# Patient Record
Sex: MALE | Race: OTHER | Employment: FULL TIME | URBAN - METROPOLITAN AREA
[De-identification: names, ages, dates, MRNs, and addresses within clinical notes are randomized per-mention and may not be internally consistent; named-entity substitution may affect disease eponyms.]

---

## 2019-08-16 PROCEDURE — 99283 EMERGENCY DEPT VISIT LOW MDM: CPT | Performed by: EMERGENCY MEDICINE

## 2019-08-17 ENCOUNTER — HOSPITAL ENCOUNTER (EMERGENCY)
Age: 29
Discharge: HOME | End: 2019-08-17
Attending: EMERGENCY MEDICINE
Payer: SELF-PAY

## 2019-08-17 VITALS
HEART RATE: 84 BPM | HEIGHT: 69 IN | OXYGEN SATURATION: 100 % | RESPIRATION RATE: 18 BRPM | WEIGHT: 120 LBS | DIASTOLIC BLOOD PRESSURE: 89 MMHG | TEMPERATURE: 97.7 F | SYSTOLIC BLOOD PRESSURE: 144 MMHG | BODY MASS INDEX: 17.77 KG/M2

## 2019-08-17 DIAGNOSIS — L70.0 CYSTIC ACNE VULGARIS: Primary | ICD-10-CM

## 2019-08-17 RX ORDER — DOXYCYCLINE HYCLATE 100 MG
100 TABLET ORAL 2 TIMES DAILY
Qty: 60 TAB | Refills: 0 | Status: SHIPPED | OUTPATIENT
Start: 2019-08-17 | End: 2019-09-16

## 2019-08-17 NOTE — ED NOTES
I have reviewed discharge instructions with the patient. The patient verbalized understanding. Patient left ED via Discharge Method: ambulatory to Home with self with friend. Opportunity for questions and clarification provided. Patient given 1 scripts. To continue your aftercare when you leave the hospital, you may receive an automated call from our care team to check in on how you are doing. This is a free service and part of our promise to provide the best care and service to meet your aftercare needs.  If you have questions, or wish to unsubscribe from this service please call [phone]. Thank you for Choosing our [facility] Emergency Department.

## 2019-08-17 NOTE — DISCHARGE INSTRUCTIONS
Take Antibiotic as prescribed. Schedule follow-up w/ Dermatology + PCP. Return if symptoms worsen or progress in any way. Acne: Care Instructions  Your Care Instructions  Acne is a skin problem that shows up as blackheads, whiteheads, and pimples. It most often affects the face, neck, and upper body. Acne occurs when oil and dead skin cells clog the skin's pores. Acne usually starts during the teen years and often lasts into adulthood. Gentle cleansing every day controls most mild acne. If home treatment does not work, your doctor may prescribe creams, antibiotics, or a stronger medicine called isotretinoin. Sometimes birth control pills help women who have monthly acne flare-ups. Follow-up care is a key part of your treatment and safety. Be sure to make and go to all appointments, and call your doctor if you are having problems. It's also a good idea to know your test results and keep a list of the medicines you take. How can you care for yourself at home? · Gently wash your face 1 or 2 times a day with warm (not hot) water and a mild soap or cleanser. Always rinse well. · Use an over-the-counter lotion or gel that contains benzoyl peroxide. Start with a small amount of 2.5% benzoyl peroxide and increase the strength as needed. Benzoyl peroxide works well for acne, but you may need to use it for up to 2 months before your acne starts to improve. · Apply acne cream, lotion, or gel to all the places you get pimples, blackheads, or whiteheads, not just where you have them now. Follow the instructions carefully. If your skin gets too dry and scaly or red and sore, reduce the amount. For the best results, apply medicines as directed. Try not to miss doses. · Do not squeeze or pick pimples and blackheads. This can cause infection and scarring. · Use only oil-free makeup, sunscreen, and other skin care products that will not clog your pores.   · Wash your hair every day, and try to keep it off your face and shoulders. Consider pinning it back or cutting it short. When should you call for help? Watch closely for changes in your health, and be sure to contact your doctor if:    · You have tried home treatment for 6 to 8 weeks and your acne is not better or gets worse. Your doctor may need to add to or change your treatment.     · Your pimples become large and hard or filled with fluid.     · Scars form after pimples heal.     · You feel sad or hopeless, lack energy, or have other signs of depression while you are taking the prescription medicine isotretinoin.     · You start to have other symptoms, such as facial hair growth in women or bone and muscle pain. Where can you learn more? Go to http://dede-alberto.info/. Enter V108 in the search box to learn more about \"Acne: Care Instructions. \"  Current as of: April 1, 2019  Content Version: 12.1  © 6109-1309 [employer]. Care instructions adapted under license by [employer] (which disclaims liability or warranty for this information). If you have questions about a medical condition or this instruction, always ask your healthcare professional. [place] [address] any warranty or liability for your use of this information. Patient Education        Acne Medicine: Care Instructions  Your Care Instructions    Medicines can help acne. They can clean skin pores, kill germs, and reduce skin oil. And they can reduce the effects of hormones. The type of medicine you take depends on the type of acne you have. The best treatment often is a mix of medicines. For example, you might take pills and put medicine on your skin. Common acne medicines include:  · Benzoyl peroxide. This includes Benzac or Brevoxyl. · Salicylic acid. This includes Propa pH or Stridex. · Retinoid medicines you put on your skin. These include adapalene (Differin) and tretinoin (Retin-A). · Antibiotic pills or ointments.  These include erythromycin and tetracycline. · Some birth control pills. Antibiotics for acne can cause side effects. They include yeast infections (in women) and diarrhea. Let your doctor know if you have side effects. Tell your doctor if you are breastfeeding or if you're pregnant or think you might get pregnant. Some of these medicines are not safe to take when you are pregnant or breastfeeding. Women who take some medicines need to use birth control. Follow-up care is a key part of your treatment and safety. Be sure to make and go to all appointments, and call your doctor if you are having problems. It's also a good idea to know your test results and keep a list of the medicines you take. How can you care for yourself at home? · Take your medicines exactly as prescribed. Call your doctor if you think you are having a problem with your medicine. You will get more details on the specific medicines your doctor prescribes. When should you call for help? Call your doctor now or seek immediate medical care if:    · You have signs of an infection, such as:  ? Increased pain, swelling, warmth, and redness. ? Red streaks leading from the affected area. ? Pus draining from the area. ? A fever.    Watch closely for changes in your health, and be sure to contact your doctor if:    · You think you may be having a problem with the medicine.     · You do not get better as expected. Where can you learn more? Go to http://dede-alberto.info/. Enter C526 in the search box to learn more about \"Acne Medicine: Care Instructions. \"  Current as of: April 1, 2019  Content Version: 12.1  © 0611-8267 [employer]. Care instructions adapted under license by [employer] (which disclaims liability or warranty for this information).  If you have questions about a medical condition or this instruction, always ask your healthcare professional. Sammie Finley disclaims any warranty or liability for your use of this information.

## 2019-08-17 NOTE — ED PROVIDER NOTES
Patient arrives related to pain to his face. Patient has been to the dermatologist and they told him it was acne. They put him on antibiotics. Patient is having pain on his face that woke him up from his sleep. The acne is also on the back of his head that is causing him pain. Denies any other complaints. The history is provided by the patient. No  was used. No past medical history on file. No past surgical history on file. No family history on file. Social History     Socioeconomic History    Marital status: SINGLE     Spouse name: Not on file    Number of children: Not on file    Years of education: Not on file    Highest education level: Not on file   Occupational History    Not on file   Social Needs    Financial resource strain: Not on file    Food insecurity:     Worry: Not on file     Inability: Not on file    Transportation needs:     Medical: Not on file     Non-medical: Not on file   Tobacco Use    Smoking status: Never Smoker   Substance and Sexual Activity    Alcohol use: No    Drug use: No    Sexual activity: Yes     Partners: Female     Birth control/protection: Condom   Lifestyle    Physical activity:     Days per week: Not on file     Minutes per session: Not on file    Stress: Not on file   Relationships    Social connections:     Talks on phone: Not on file     Gets together: Not on file     Attends Mu-ism service: Not on file     Active member of club or organization: Not on file     Attends meetings of clubs or organizations: Not on file     Relationship status: Not on file    Intimate partner violence:     Fear of current or ex partner: Not on file     Emotionally abused: Not on file     Physically abused: Not on file     Forced sexual activity: Not on file   Other Topics Concern    Not on file   Social History Narrative    Not on file         ALLERGIES: Patient has no known allergies.     Review of Systems    Vitals:    08/17/19 0053   BP: 137/78   Pulse: 89   Resp: 18   Temp: 97.8 °F (36.6 °C)   SpO2: 100%   Weight: 54.4 kg (120 lb)   Height: 5' 9\" (1.753 m)            Physical Exam           MDM  Number of Diagnoses or Management Options     Amount and/or Complexity of Data Reviewed  Review and summarize past medical records: yes    Risk of Complications, Morbidity, and/or Mortality  Presenting problems: minimal  Diagnostic procedures: minimal  Management options: minimal    Patient Progress  Patient progress: stable         Procedures                   Benjamin Sow MD; 8/17/2019 @2:51 AM Voice dictation software was used during the making of this note. This software is not perfect and grammatical and other typographical errors may be present.   This note has not been proofread for errors.  ===================================================================

## 2019-08-17 NOTE — LETTER
[address] [facility] EMERGENCY DEPT 
[address] [facility] [facility] 
[phone] Work/School Note Date: 8/16/2019 To Whom It May concern: Sky Bell was seen and treated today in the office by the following: Dr. Tri Cooper Sky Bell may return to work on Monday, August, 19, 2019. Sincerely, Obdulia ELDERN, RN, MAGGIE

## 2019-08-17 NOTE — ED PROVIDER NOTES
35 yo M w/ history of severe chronic cystic acne presents w/ c/o worsening pain related to acne. States he has had severe acne since he was 15 yrs old. States he was seen by Dermatologist in the past in [place], [place] and placed on Doxycycline. States he has been off of antibiotics for over ~8 months. States he was noted worsening pain related to acne over the past several weeks. Reports acne located to face, upper back, and chest. States he has chronic drainage from sites. Denies fever, chills, nausea, vomiting. 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 States he takes Ibuprofen for pain. States when he was on Doxycycline in the past it helped his acne. States he washes his face daily. The history is provided by the patient. No  was used. Skin Problem    This is a chronic problem. The current episode started more than 1 week ago (since 15 yrs old ). The problem has not changed since onset. The problem is associated with nothing. There has been no fever. The rash is present on the face, back and chest. The pain is at a severity of 3/10. The pain is mild. The pain has been constant since onset. Associated symptoms include pain and weeping. Pertinent negatives include no blisters and no itching. He has tried nothing for the symptoms. The treatment provided no relief. No past medical history on file. No past surgical history on file. No family history on file.     Social History     Socioeconomic History    Marital status: SINGLE     Spouse name: Not on file    Number of children: Not on file    Years of education: Not on file    Highest education level: Not on file   Occupational History    Not on file   Social Needs    Financial resource strain: Not on file    Food insecurity:     Worry: Not on file     Inability: Not on file    Transportation needs:     Medical: Not on file     Non-medical: Not on file   Tobacco Use    Smoking status: Never Smoker   Substance and Sexual Activity  Alcohol use: No    Drug use: No    Sexual activity: Yes     Partners: Female     Birth control/protection: Condom   Lifestyle    Physical activity:     Days per week: Not on file     Minutes per session: Not on file    Stress: Not on file   Relationships    Social connections:     Talks on phone: Not on file     Gets together: Not on file     Attends Jehovah's witness service: Not on file     Active member of club or organization: Not on file     Attends meetings of clubs or organizations: Not on file     Relationship status: Not on file    Intimate partner violence:     Fear of current or ex partner: Not on file     Emotionally abused: Not on file     Physically abused: Not on file     Forced sexual activity: Not on file   Other Topics Concern    Not on file   Social History Narrative    Not on file         ALLERGIES: Patient has no known allergies. Review of Systems   Constitutional: Negative for chills and fever. HENT: Negative for congestion, dental problem, facial swelling, mouth sores and sore throat. Respiratory: Negative for cough and shortness of breath. Cardiovascular: Negative for chest pain. Gastrointestinal: Negative for nausea and vomiting. Skin: Positive for rash. Negative for itching. Neurological: Negative for headaches. Vitals:    08/17/19 0053   BP: 137/78   Pulse: 89   Resp: 18   Temp: 97.8 °F (36.6 °C)   SpO2: 100%   Weight: 54.4 kg (120 lb)   Height: 5' 9\" (1.753 m)            Physical Exam   Constitutional: He is oriented to person, place, and time. He appears well-developed. Patient well-appearing and in no acute distress. HENT:   Head: Normocephalic. Extensive severe cystic acne w/ scarring noted to face, posterior scalp, and posterior hairline. MMM. No tonsillar erythema or exudate noted. Uvula midline. Eyes: Pupils are equal, round, and reactive to light. EOM are normal.   Neck: Normal range of motion. Neck supple.    Cardiovascular: Normal rate, regular rhythm and normal heart sounds. Radial pulses 2+ and equal bilaterally. Pulmonary/Chest: Effort normal and breath sounds normal.   CTAB. Abdominal: Soft. There is no tenderness. Soft, NTND. Musculoskeletal: Normal range of motion. No LE edema or calf TTP. Neurological: He is alert and oriented to person, place, and time. No cranial nerve deficit or sensory deficit. Skin: Skin is warm and dry. Extensive cystic acne noted to face, posterior neck extending to occipital scalp region with purulent drainage noted. Additionally cystic acne located to upper chest wall and upper back region. Psychiatric: He has a normal mood and affect. Nursing note and vitals reviewed. MDM  Number of Diagnoses or Management Options  Cystic acne vulgaris:   Diagnosis management comments: VSS. Afebrile. Pt with severe cystic acne. Given po pain control. Reports improvement in the past on Doxycycline. Will prescribe Doxycycline with instructions to use face wash twice daily. Given instructions to follow-up with Dermatology. Pt given return precautions. Amount and/or Complexity of Data Reviewed  Tests in the medicine section of CPT®: ordered and reviewed  Review and summarize past medical records: yes    Risk of Complications, Morbidity, and/or Mortality  Presenting problems: low  Diagnostic procedures: low  Management options: low    Patient Progress  Patient progress: stable         Procedures             Benjamin Marks MD; 8/17/2019 @5:09 AM Voice dictation software was used during the making of this note. This software is not perfect and grammatical and other typographical errors may be present.   This note has not been proofread for errors.  ===================================================================

## 2019-08-17 NOTE — ED TRIAGE NOTES
Patient arrives related to pain to his face. Patient has been to the dermatologist and they told him it was acne. They put him on antibiotics. Patient is having pain on his face that woke him up from his sleep. The acne is also on the back of his head that is causing him pain. Denies any other complaints.